# Patient Record
Sex: FEMALE | Race: OTHER | HISPANIC OR LATINO | URBAN - METROPOLITAN AREA
[De-identification: names, ages, dates, MRNs, and addresses within clinical notes are randomized per-mention and may not be internally consistent; named-entity substitution may affect disease eponyms.]

---

## 2018-04-02 ENCOUNTER — EMERGENCY (EMERGENCY)
Facility: HOSPITAL | Age: 28
LOS: 1 days | Discharge: HOME | End: 2018-04-02
Attending: EMERGENCY MEDICINE | Admitting: EMERGENCY MEDICINE
Payer: COMMERCIAL

## 2018-04-02 VITALS
DIASTOLIC BLOOD PRESSURE: 62 MMHG | HEART RATE: 84 BPM | SYSTOLIC BLOOD PRESSURE: 110 MMHG | RESPIRATION RATE: 16 BRPM | OXYGEN SATURATION: 100 %

## 2018-04-02 VITALS
OXYGEN SATURATION: 99 % | DIASTOLIC BLOOD PRESSURE: 53 MMHG | TEMPERATURE: 98 F | SYSTOLIC BLOOD PRESSURE: 113 MMHG | HEART RATE: 88 BPM | RESPIRATION RATE: 18 BRPM

## 2018-04-02 LAB
ALBUMIN SERPL ELPH-MCNC: 3.8 G/DL — SIGNIFICANT CHANGE UP (ref 3.3–5)
ALP SERPL-CCNC: 94 U/L — SIGNIFICANT CHANGE UP (ref 40–120)
ALT FLD-CCNC: 42 U/L — HIGH (ref 4–33)
APPEARANCE UR: CLEAR — SIGNIFICANT CHANGE UP
AST SERPL-CCNC: 35 U/L — HIGH (ref 4–32)
BASOPHILS # BLD AUTO: 0.04 K/UL — SIGNIFICANT CHANGE UP (ref 0–0.2)
BASOPHILS NFR BLD AUTO: 0.6 % — SIGNIFICANT CHANGE UP (ref 0–2)
BILIRUB SERPL-MCNC: 0.5 MG/DL — SIGNIFICANT CHANGE UP (ref 0.2–1.2)
BILIRUB UR-MCNC: NEGATIVE — SIGNIFICANT CHANGE UP
BLOOD UR QL VISUAL: NEGATIVE — SIGNIFICANT CHANGE UP
BUN SERPL-MCNC: 12 MG/DL — SIGNIFICANT CHANGE UP (ref 7–23)
CALCIUM SERPL-MCNC: 8.8 MG/DL — SIGNIFICANT CHANGE UP (ref 8.4–10.5)
CHLORIDE SERPL-SCNC: 104 MMOL/L — SIGNIFICANT CHANGE UP (ref 98–107)
CO2 SERPL-SCNC: 25 MMOL/L — SIGNIFICANT CHANGE UP (ref 22–31)
COLOR SPEC: YELLOW — SIGNIFICANT CHANGE UP
CREAT SERPL-MCNC: 0.75 MG/DL — SIGNIFICANT CHANGE UP (ref 0.5–1.3)
EOSINOPHIL # BLD AUTO: 0.16 K/UL — SIGNIFICANT CHANGE UP (ref 0–0.5)
EOSINOPHIL NFR BLD AUTO: 2.4 % — SIGNIFICANT CHANGE UP (ref 0–6)
GLUCOSE SERPL-MCNC: 88 MG/DL — SIGNIFICANT CHANGE UP (ref 70–99)
GLUCOSE UR-MCNC: NEGATIVE — SIGNIFICANT CHANGE UP
HCT VFR BLD CALC: 45.2 % — HIGH (ref 34.5–45)
HGB BLD-MCNC: 14.7 G/DL — SIGNIFICANT CHANGE UP (ref 11.5–15.5)
IMM GRANULOCYTES # BLD AUTO: 0.01 # — SIGNIFICANT CHANGE UP
IMM GRANULOCYTES NFR BLD AUTO: 0.1 % — SIGNIFICANT CHANGE UP (ref 0–1.5)
KETONES UR-MCNC: NEGATIVE — SIGNIFICANT CHANGE UP
LEUKOCYTE ESTERASE UR-ACNC: NEGATIVE — SIGNIFICANT CHANGE UP
LYMPHOCYTES # BLD AUTO: 2.28 K/UL — SIGNIFICANT CHANGE UP (ref 1–3.3)
LYMPHOCYTES # BLD AUTO: 33.6 % — SIGNIFICANT CHANGE UP (ref 13–44)
MCHC RBC-ENTMCNC: 29.8 PG — SIGNIFICANT CHANGE UP (ref 27–34)
MCHC RBC-ENTMCNC: 32.5 % — SIGNIFICANT CHANGE UP (ref 32–36)
MCV RBC AUTO: 91.7 FL — SIGNIFICANT CHANGE UP (ref 80–100)
MONOCYTES # BLD AUTO: 0.53 K/UL — SIGNIFICANT CHANGE UP (ref 0–0.9)
MONOCYTES NFR BLD AUTO: 7.8 % — SIGNIFICANT CHANGE UP (ref 2–14)
MUCOUS THREADS # UR AUTO: SIGNIFICANT CHANGE UP
NEUTROPHILS # BLD AUTO: 3.77 K/UL — SIGNIFICANT CHANGE UP (ref 1.8–7.4)
NEUTROPHILS NFR BLD AUTO: 55.5 % — SIGNIFICANT CHANGE UP (ref 43–77)
NITRITE UR-MCNC: NEGATIVE — SIGNIFICANT CHANGE UP
NRBC # FLD: 0 — SIGNIFICANT CHANGE UP
PH UR: 7.5 — SIGNIFICANT CHANGE UP (ref 4.6–8)
PLATELET # BLD AUTO: 251 K/UL — SIGNIFICANT CHANGE UP (ref 150–400)
PMV BLD: 10.4 FL — SIGNIFICANT CHANGE UP (ref 7–13)
POTASSIUM SERPL-MCNC: 4.4 MMOL/L — SIGNIFICANT CHANGE UP (ref 3.5–5.3)
POTASSIUM SERPL-SCNC: 4.4 MMOL/L — SIGNIFICANT CHANGE UP (ref 3.5–5.3)
PROT SERPL-MCNC: 7.5 G/DL — SIGNIFICANT CHANGE UP (ref 6–8.3)
PROT UR-MCNC: NEGATIVE MG/DL — SIGNIFICANT CHANGE UP
RBC # BLD: 4.93 M/UL — SIGNIFICANT CHANGE UP (ref 3.8–5.2)
RBC # FLD: 13.1 % — SIGNIFICANT CHANGE UP (ref 10.3–14.5)
RBC CASTS # UR COMP ASSIST: SIGNIFICANT CHANGE UP (ref 0–?)
SODIUM SERPL-SCNC: 139 MMOL/L — SIGNIFICANT CHANGE UP (ref 135–145)
SP GR SPEC: 1.02 — SIGNIFICANT CHANGE UP (ref 1–1.04)
SQUAMOUS # UR AUTO: SIGNIFICANT CHANGE UP
UROBILINOGEN FLD QL: NORMAL MG/DL — SIGNIFICANT CHANGE UP
WBC # BLD: 6.79 K/UL — SIGNIFICANT CHANGE UP (ref 3.8–10.5)
WBC # FLD AUTO: 6.79 K/UL — SIGNIFICANT CHANGE UP (ref 3.8–10.5)
WBC UR QL: SIGNIFICANT CHANGE UP (ref 0–?)

## 2018-04-02 PROCEDURE — 99285 EMERGENCY DEPT VISIT HI MDM: CPT

## 2018-04-02 PROCEDURE — 74176 CT ABD & PELVIS W/O CONTRAST: CPT | Mod: 26

## 2018-04-02 RX ORDER — MORPHINE SULFATE 50 MG/1
4 CAPSULE, EXTENDED RELEASE ORAL ONCE
Qty: 0 | Refills: 0 | Status: DISCONTINUED | OUTPATIENT
Start: 2018-04-02 | End: 2018-04-02

## 2018-04-02 RX ORDER — SODIUM CHLORIDE 9 MG/ML
1000 INJECTION INTRAMUSCULAR; INTRAVENOUS; SUBCUTANEOUS ONCE
Qty: 0 | Refills: 0 | Status: COMPLETED | OUTPATIENT
Start: 2018-04-02 | End: 2018-04-02

## 2018-04-02 RX ORDER — ONDANSETRON 8 MG/1
4 TABLET, FILM COATED ORAL ONCE
Qty: 0 | Refills: 0 | Status: COMPLETED | OUTPATIENT
Start: 2018-04-02 | End: 2018-04-02

## 2018-04-02 RX ADMIN — MORPHINE SULFATE 4 MILLIGRAM(S): 50 CAPSULE, EXTENDED RELEASE ORAL at 11:27

## 2018-04-02 RX ADMIN — SODIUM CHLORIDE 1000 MILLILITER(S): 9 INJECTION INTRAMUSCULAR; INTRAVENOUS; SUBCUTANEOUS at 11:27

## 2018-04-02 RX ADMIN — ONDANSETRON 4 MILLIGRAM(S): 8 TABLET, FILM COATED ORAL at 11:27

## 2018-04-02 NOTE — ED PROVIDER NOTE - ATTENDING CONTRIBUTION TO CARE
27f presents with flank pain, dysuria, hematuria. Flank pain started few days ago, left side, nonradiating, 27f presents with flank pain, dysuria, hematuria. Flank pain started few days ago, left side, nonradiating, constant, has never had this kind of pain before, worsening. Also noted dysuria and one episode of hematuria with nausea. No fevers, ha, cp, sob, vomiting, diarrhea. LMP last week.  exam  GEN - NAD; well appearing; A+O x3   HEAD - NC/AT   EYES- PERRL, EOMI  ENT: Airway patent, mmm, Oral cavity and pharynx normal. No inflammation, swelling, exudate, or lesions.  NECK: Neck supple, non-tender without lymphadenopathy, no masses.  PULMONARY - CTA b/l, symmetric breath sounds.   CARDIAC -s1s2, RRR, no M,G,R  ABDOMEN - +BS, ND, NT, soft, no guarding, no rebound, no masses   BACK - mild l. cvat, Normal  spine   EXTREMITIES - FROM, symmetric pulses, capillary refill < 2 seconds, no edema   SKIN - no rash or bruising   NEUROLOGIC - alert, speech clear, no focal deficits  PSYCH -nl mood/affect, nl insight.  a/p-left flank pain, dysuria, episode of hematuria, generally well appearing, vss, abd nontender, mild cvat, will check labs, ua, ucg, pain ctrl, fluids, ct, reass. 27f presents with flank pain, dysuria, hematuria. Flank pain started few days ago, left side, nonradiating, constant, has never had this kind of pain before, worsening, does worsen with movement. Also noted dysuria and one episode of hematuria. No fevers, ha, cp, sob, vomiting, diarrhea. LMP last week.  exam  GEN - NAD; well appearing; A+O x3   HEAD - NC/AT   EYES- PERRL, EOMI  ENT: Airway patent, mmm, Oral cavity and pharynx normal. No inflammation, swelling, exudate, or lesions.  NECK: Neck supple, non-tender without lymphadenopathy, no masses.  PULMONARY - CTA b/l, symmetric breath sounds.   CARDIAC -s1s2, RRR, no M,G,R  ABDOMEN - +BS, ND, NT, soft, no guarding, no rebound, no masses   BACK - mild l. cvat, Normal  spine   EXTREMITIES - FROM, symmetric pulses, capillary refill < 2 seconds, no edema   SKIN - no rash or bruising   NEUROLOGIC - alert, speech clear, no focal deficits  PSYCH -nl mood/affect, nl insight.  a/p-left flank pain, dysuria, episode of hematuria, generally well appearing, vss, abd nontender, mild cvat, will check labs, ua, ucg, pain ctrl, fluids, ct, reass. 27f presents with flank pain. Flank pain started few days ago, left side, nonradiating, constant, has never had this kind of pain before, worsening, does worsen with movement. Also noted one episode of hematuria. No fevers, ha, cp, sob, vomiting, dysuria, diarrhea. LMP last week.  exam  GEN - NAD; well appearing; A+O x3   HEAD - NC/AT   EYES- PERRL, EOMI  ENT: Airway patent, mmm, Oral cavity and pharynx normal. No inflammation, swelling, exudate, or lesions.  NECK: Neck supple, non-tender without lymphadenopathy, no masses.  PULMONARY - CTA b/l, symmetric breath sounds.   CARDIAC -s1s2, RRR, no M,G,R  ABDOMEN - +BS, ND, NT, soft, no guarding, no rebound, no masses   BACK - mild l. cvat, mild reproducible pain with movement in left flank, Normal  spine   EXTREMITIES - FROM, symmetric pulses, capillary refill < 2 seconds, no edema   SKIN - no rash or bruising   NEUROLOGIC - alert, speech clear, no focal deficits  PSYCH -nl mood/affect, nl insight.  a/p-left flank pain, dysuria, episode of hematuria, generally well appearing, vss, abd nontender, mild cvat, will check labs, ua, ucg, pain ctrl, fluids, ct, reass.

## 2018-04-02 NOTE — ED PROVIDER NOTE - PROGRESS NOTE DETAILS
Ct showing no hydronephrosis, 4mm nonobstructing calculus, pt feeling symptoms improved Ct showing no hydronephrosis, 4mm nonobstructing calculus in the right kidney, despite L sided flank pain. Intrarenal in nature and on opposite highly susp that stone is not source of pain. Pain likely [facility] all results d/w patient and copies given, instructed to f/u pcp within next few days, return precautions discussed.

## 2018-04-02 NOTE — ED PROVIDER NOTE - MEDICAL DECISION MAKING DETAILS
This pt is a 27 y female w PMHx asthma p/w L flank pain w associated nausea and dysuria, worsening for last 5 days. One episode of hematuria last week. Reports occasional chills, no fevers, no vomiting. Her pain is in the L CVA and does not radiate. No hx of kidney stones prior.  Ct for stone hunt, ua +ucx, fluids, labs for kidney function, sx control -[facility] This pt is a 27 y female w PMHx asthma p/w L flank pain w associated nausea and dysuria, worsening for last 5 days. One episode of hematuria last week. no fevers, no vomiting. Her pain is in the L CVA and does not radiate. No hx of kidney stones prior.  Ct for stone hunt, ua +ucx, fluids, labs for kidney function, sx control -[facility] This pt is a 27 y female w PMHx asthma p/w L flank pain w associated nausea and dysuria, worsening for last 5 days. One episode of hematuria last week. no fevers, no vomiting. Her pain is in the L CVA and does not radiate. No hx of kidney stones prior.  Ct for stone ng, ua ucx, fluids, labs for kidney function, sx control -[facility]

## 2018-04-02 NOTE — ED PROVIDER NOTE - OBJECTIVE STATEMENT
This pt is a 27 y female w PMHx asthma p/w L flank pain w associated nausea and dysuria, worsening for last 5 days. One episode of hematuria last week. Reports occasional chills, no fevers, no vomiting. Her pain is in the L CVA and does not radiate. No hx of kidney stones prior. This pt is a 27 y female w PMHx asthma p/w L flank pain worsening for last 5 days. One episode of hematuria last week. Reports occasional chills, no fevers, no vomiting. Her pain is in the L CVA and does not radiate, does get worse with movement. No hx of kidney stones prior.

## 2018-04-02 NOTE — ED ADULT TRIAGE NOTE - CHIEF COMPLAINT QUOTE
c/o left flank pain getting worse over the past 5 days.  c/o nausea.  c/o dysuria.  reports hematuria 1 week ago.    denies past med history.    well appearing female in nad.

## 2018-04-03 LAB — SPECIMEN SOURCE: SIGNIFICANT CHANGE UP

## 2018-04-04 LAB — BACTERIA UR CULT: SIGNIFICANT CHANGE UP

## 2020-12-02 NOTE — ED PROVIDER NOTE - GENITOURINARY [+], MLM
Writer contacted Dr. Warner in regards to pt's dinner BG. Per MD can given scheduled humalog and metformin.   dysuria/HEMATURIA

## 2021-12-17 NOTE — ED ADULT TRIAGE NOTE - NS ED TRIAGE AVPU SCALE
Pt added to NP schedule today at 1:20pm     Alert-The patient is alert, awake and responds to voice. The patient is oriented to time, place, and person. The triage nurse is able to obtain subjective information.